# Patient Record
Sex: FEMALE | Race: WHITE | Employment: OTHER | URBAN - METROPOLITAN AREA
[De-identification: names, ages, dates, MRNs, and addresses within clinical notes are randomized per-mention and may not be internally consistent; named-entity substitution may affect disease eponyms.]

---

## 2017-02-08 PROBLEM — R73.02 GLUCOSE INTOLERANCE (IMPAIRED GLUCOSE TOLERANCE): Status: ACTIVE | Noted: 2017-02-08

## 2017-06-09 PROBLEM — F33.42 RECURRENT MAJOR DEPRESSIVE DISORDER, IN FULL REMISSION (HCC): Status: ACTIVE | Noted: 2017-06-09

## 2018-05-16 PROBLEM — E78.00 HYPERCHOLESTEREMIA: Status: ACTIVE | Noted: 2018-05-16

## 2019-06-04 ENCOUNTER — HOSPITAL ENCOUNTER (OUTPATIENT)
Dept: GENERAL RADIOLOGY | Age: 64
Discharge: HOME | End: 2019-06-04
Attending: INTERNAL MEDICINE
Payer: MEDICARE

## 2019-06-04 DIAGNOSIS — M54.50 CHRONIC RIGHT-SIDED LOW BACK PAIN WITHOUT SCIATICA: ICD-10-CM

## 2019-06-04 DIAGNOSIS — G89.29 CHRONIC RIGHT-SIDED LOW BACK PAIN WITHOUT SCIATICA: ICD-10-CM

## 2019-06-04 PROCEDURE — 72100 X-RAY EXAM L-S SPINE 2/3 VWS: CPT

## 2019-06-12 ENCOUNTER — APPOINTMENT (OUTPATIENT)
Dept: MAMMOGRAPHY | Age: 64
End: 2019-06-12
Attending: INTERNAL MEDICINE
Payer: MEDICARE

## 2019-06-12 ENCOUNTER — HOSPITAL ENCOUNTER (OUTPATIENT)
Dept: VASCULAR SURGERY | Age: 64
Discharge: HOME | End: 2019-06-12
Attending: INTERNAL MEDICINE
Payer: MEDICARE

## 2019-06-12 DIAGNOSIS — R09.89 BRUIT: ICD-10-CM

## 2019-06-12 PROCEDURE — 93880 EXTRACRANIAL BILAT STUDY: CPT

## 2019-06-13 LAB
LEFT CCA DIST DIAS: 25 CM/S
LEFT CCA DIST SYS: 97.4 CM/S
LEFT CCA PROX DIAS: 14.6 CM/S
LEFT CCA PROX SYS: 92.2 CM/S
LEFT ECA DIAS: 22.07 CM/S
LEFT ECA SYS: 105.4 CM/S
LEFT ICA DIST DIAS: 32.8 CM/S
LEFT ICA DIST SYS: 92.5 CM/S
LEFT ICA MID DIAS: 28.5 CM/S
LEFT ICA MID SYS: 75.4 CM/S
LEFT ICA PROX DIAS: 19.8 CM/S
LEFT ICA PROX SYS: 69 CM/S
LEFT ICA/CCA SYS: 0.95
LEFT VERTEBRAL DIAS: 16.81 CM/S
LEFT VERTEBRAL SYS: 52 CM/S
RIGHT CCA DIST DIAS: 23.7 CM/S
RIGHT CCA DIST SYS: 74.2 CM/S
RIGHT CCA PROX DIAS: 15.9 CM/S
RIGHT CCA PROX SYS: 101.3 CM/S
RIGHT ECA DIAS: 15.89 CM/S
RIGHT ECA SYS: 96.1 CM/S
RIGHT ICA DIST DIAS: 33.3 CM/S
RIGHT ICA DIST SYS: 109.3 CM/S
RIGHT ICA MID DIAS: 35.5 CM/S
RIGHT ICA MID SYS: 101.6 CM/S
RIGHT ICA PROX DIAS: 25.6 CM/S
RIGHT ICA PROX SYS: 71.8 CM/S
RIGHT ICA/CCA SYS: 1.5
RIGHT VERTEBRAL DIAS: 17.71 CM/S
RIGHT VERTEBRAL SYS: 49.1 CM/S

## 2021-02-19 ENCOUNTER — HOSPITAL ENCOUNTER (OUTPATIENT)
Dept: ULTRASOUND IMAGING | Age: 66
Discharge: HOME | End: 2021-02-19
Attending: INTERNAL MEDICINE
Payer: MEDICARE

## 2021-02-19 DIAGNOSIS — M79.661 RIGHT CALF PAIN: ICD-10-CM

## 2021-02-19 PROCEDURE — 93971 EXTREMITY STUDY: CPT

## 2021-10-03 ENCOUNTER — HOSPITAL ENCOUNTER (EMERGENCY)
Age: 66
Discharge: HOME | End: 2021-10-03
Attending: EMERGENCY MEDICINE | Admitting: EMERGENCY MEDICINE
Payer: MEDICARE

## 2021-10-03 VITALS
TEMPERATURE: 97.3 F | DIASTOLIC BLOOD PRESSURE: 84 MMHG | SYSTOLIC BLOOD PRESSURE: 156 MMHG | OXYGEN SATURATION: 99 % | RESPIRATION RATE: 18 BRPM | HEART RATE: 76 BPM

## 2021-10-03 DIAGNOSIS — L03.90 CELLULITIS, UNSPECIFIED CELLULITIS SITE: ICD-10-CM

## 2021-10-03 DIAGNOSIS — B37.2 CANDIDAL INTERTRIGO: Primary | ICD-10-CM

## 2021-10-03 PROCEDURE — 74011250637 HC RX REV CODE- 250/637: Performed by: PHYSICIAN ASSISTANT

## 2021-10-03 PROCEDURE — 99283 EMERGENCY DEPT VISIT LOW MDM: CPT

## 2021-10-03 RX ORDER — DOXYCYCLINE HYCLATE 100 MG
100 TABLET ORAL 2 TIMES DAILY
Qty: 14 TABLET | Refills: 0 | Status: SHIPPED | OUTPATIENT
Start: 2021-10-03 | End: 2021-10-10

## 2021-10-03 RX ORDER — DOXYCYCLINE HYCLATE 100 MG
100 TABLET ORAL
Status: COMPLETED | OUTPATIENT
Start: 2021-10-03 | End: 2021-10-03

## 2021-10-03 RX ORDER — DOXYLAMINE SUCCINATE 25 MG
TABLET ORAL 2 TIMES DAILY
Qty: 15 G | Refills: 0 | Status: SHIPPED | OUTPATIENT
Start: 2021-10-03

## 2021-10-03 RX ADMIN — DOXYCYCLINE HYCLATE 100 MG: 100 TABLET, COATED ORAL at 17:16

## 2021-10-03 NOTE — ED TRIAGE NOTES
TRIAGE NOTE:  Patient arrives ambulatory with c/o pustules and to left groin in the crease. Patient reports started a couple weeks ago. Patient reports was placed on Bactrim and that helped a little.

## 2021-10-03 NOTE — ED PROVIDER NOTES
79 y/o female presenting with complaint of skin problem. The patient states that 2 and a half weeks ago she noticed a red itchy rash in her left groin with associated pustules. She started taking Bactrim 9 days ago and states that the pustules have now resolved, however over the past few days the rash has been to worsen somewhat again. She denies fevers, nausea, vomiting, body aches or dysuria. The history is provided by the patient. Past Medical History:   Diagnosis Date    Glucose intolerance (impaired glucose tolerance) 2017    Hypertension     Hypothyroid     Psychiatric disorder     depression    Unspecified hypothyroidism 1/3/2014       Past Surgical History:   Procedure Laterality Date    BREAST SURGERY PROCEDURE UNLISTED      breast implants and removal    HX GYN      hysterectomy         Family History:   Problem Relation Age of Onset    Heart Disease Father     Cancer Father          76 colon    Hypertension Father        Social History     Socioeconomic History    Marital status:      Spouse name: Not on file    Number of children: Not on file    Years of education: Not on file    Highest education level: Not on file   Occupational History    Not on file   Tobacco Use    Smoking status: Never Smoker    Smokeless tobacco: Never Used   Substance and Sexual Activity    Alcohol use: No    Drug use: Not on file    Sexual activity: Not on file   Other Topics Concern    Not on file   Social History Narrative    Not on file     Social Determinants of Health     Financial Resource Strain:     Difficulty of Paying Living Expenses:    Food Insecurity:     Worried About Running Out of Food in the Last Year:     Ran Out of Food in the Last Year:    Transportation Needs:     Lack of Transportation (Medical):      Lack of Transportation (Non-Medical):    Physical Activity:     Days of Exercise per Week:     Minutes of Exercise per Session:    Stress:     Feeling of Stress :    Social Connections:     Frequency of Communication with Friends and Family:     Frequency of Social Gatherings with Friends and Family:     Attends [employer] Services:     Active Member of Clubs or Organizations:     Attends Club or Organization Meetings:     Marital Status:    Intimate Partner Violence:     Fear of Current or Ex-Partner:     Emotionally Abused:     Physically Abused:     Sexually Abused: ALLERGIES: Ciprofloxacin, Flagyl [metronidazole], and Keflex [cephalexin]    Review of Systems   Constitutional: Negative for fever. HENT: Negative for congestion. Respiratory: Negative for cough. Gastrointestinal: Negative for diarrhea and vomiting. Genitourinary: Negative for dysuria, frequency and urgency. Musculoskeletal: Negative for arthralgias and myalgias. Skin: Positive for rash. Neurological: Negative for syncope and light-headedness. Vitals:    10/03/21 1617   BP: (!) 156/84   Pulse: 76   Resp: 18   Temp: 97.3 °F (36.3 °C)   SpO2: 99%            Physical Exam  Vitals and nursing note reviewed. Constitutional:       General: She is not in acute distress. Appearance: She is well-developed. She is not diaphoretic. HENT:      Head: Normocephalic and atraumatic. Eyes:      Conjunctiva/sclera: Conjunctivae normal.   Cardiovascular:      Rate and Rhythm: Normal rate. Pulmonary:      Effort: Pulmonary effort is normal. No respiratory distress. Musculoskeletal:      Cervical back: Normal range of motion and neck supple. Skin:     General: Skin is warm and dry. Comments: Left groin with large area of erythema, raised borders and satellite lesions noted. Neurological:      Mental Status: She is alert and oriented to person, place, and time. MDM       Procedures        77 y/o female presenting with complaint of skin problem. History and exam consistent with candidal intertrigo with likely secondary bacterial skin infection. Will discontinue Bactrim, prescribe doxycycline and topical miconazole. PCP follow up if rash continues. Strict ED return precautions discussed and provided in writing at time of discharge. The patient verbalized understanding and agreement with this plan.

## 2022-02-22 PROBLEM — E74.9 CARBOHYDRATE METABOLISM DISORDER (HCC): Status: ACTIVE | Noted: 2022-02-22

## 2022-03-19 PROBLEM — E74.9 CARBOHYDRATE METABOLISM DISORDER (HCC): Status: ACTIVE | Noted: 2022-02-22

## 2022-03-19 PROBLEM — R73.02 GLUCOSE INTOLERANCE (IMPAIRED GLUCOSE TOLERANCE): Status: ACTIVE | Noted: 2017-02-08

## 2022-03-19 PROBLEM — E78.00 HYPERCHOLESTEREMIA: Status: ACTIVE | Noted: 2018-05-16

## 2022-03-19 PROBLEM — F33.42 RECURRENT MAJOR DEPRESSIVE DISORDER, IN FULL REMISSION (HCC): Status: ACTIVE | Noted: 2017-06-09

## 2024-03-14 ENCOUNTER — OFFICE VISIT (OUTPATIENT)
Age: 69
End: 2024-03-14

## 2024-03-14 VITALS — HEIGHT: 66 IN | WEIGHT: 185 LBS | BODY MASS INDEX: 29.73 KG/M2

## 2024-03-14 DIAGNOSIS — Z12.39 SCREENING BREAST EXAMINATION: Primary | ICD-10-CM

## 2024-03-14 NOTE — PROGRESS NOTES
HISTORY OF PRESENT ILLNESS  Kolby Wahl is a 68 y.o. female     HPI NEW patient consult referred by Dr. Harley George for screening breast exam.  Her last mammogram was about 5 years ago.  It was very painful and she is no longer doing mammograms.  Silicone implants [id number].  Capsules remain.   Here with her     No family history of breast or ovarian cancer.     Mammogram about 5 years ago.  painful    Past Medical History:   Diagnosis Date    Glucose intolerance (impaired glucose tolerance) 2017    Hypertension     Hypothyroid     Psychiatric disorder     depression    Unspecified hypothyroidism 1/3/2014     Past Surgical History:   Procedure Laterality Date    BREAST SURGERY      breast implants and removal    GYN      hysterectomy      OB History    No obstetric history on file.      Obstetric Comments   Menarche 11, LMP , # of children 3, age of 1st delivery 28, Hysterectomy/oophorectomy yes-/no, Breast bx no, history of breast feeding no, BCP no, Hormone therapy no             Family History   Problem Relation Age of Onset    Heart Disease Father     Cancer Father          75 colon    Hypertension Father      Social History     Tobacco Use    Smoking status: Never    Smokeless tobacco: Never   Substance Use Topics    Alcohol use: No      Prior to Admission medications    Medication Sig Start Date End Date Taking? Authorizing Provider   levothyroxine (SYNTHROID) 125 MCG tablet Take 1 tablet by mouth daily 24  Yes Harley George MD   sertraline (ZOLOFT) 25 MG tablet Take 2 tablets by mouth daily 20  Yes Automatic Reconciliation, Ar   permethrin (ELIMITE) 5 % cream Apply topically daily as directed 3/3/24   Harley George MD   sertraline (ZOLOFT) 50 MG tablet TAKE ONE TABLET BY MOUTH EVERY DAY (CIPLA BRAND) 23   Harley George MD      Allergies   Allergen Reactions    Cephalexin Other (See Comments)     insomnia    Ciprofloxacin Hives    Metronidazole Hives

## 2024-07-09 ENCOUNTER — APPOINTMENT (OUTPATIENT)
Facility: HOSPITAL | Age: 69
End: 2024-07-09
Payer: MEDICARE

## 2024-07-09 ENCOUNTER — HOSPITAL ENCOUNTER (EMERGENCY)
Facility: HOSPITAL | Age: 69
Discharge: HOME | End: 2024-07-09
Attending: EMERGENCY MEDICINE
Payer: MEDICARE

## 2024-07-09 VITALS
HEART RATE: 48 BPM | DIASTOLIC BLOOD PRESSURE: 43 MMHG | HEIGHT: 66 IN | TEMPERATURE: 98.1 F | BODY MASS INDEX: 29.09 KG/M2 | RESPIRATION RATE: 16 BRPM | SYSTOLIC BLOOD PRESSURE: 107 MMHG | WEIGHT: 181 LBS | OXYGEN SATURATION: 95 %

## 2024-07-09 DIAGNOSIS — K65.4 MESENTERIC PANNICULITIS (HCC): Primary | ICD-10-CM

## 2024-07-09 LAB
ALBUMIN SERPL-MCNC: 3.6 G/DL (ref 3.5–5)
ALBUMIN/GLOB SERPL: 1.1 (ref 1.1–2.2)
ALP SERPL-CCNC: 78 U/L (ref 45–117)
ALT SERPL-CCNC: 23 U/L (ref 12–78)
ANION GAP SERPL CALC-SCNC: 4 MMOL/L (ref 5–15)
APPEARANCE UR: CLEAR
AST SERPL-CCNC: 13 U/L (ref 15–37)
BACTERIA URNS QL MICRO: NEGATIVE /HPF
BASOPHILS # BLD: 0.1 K/UL (ref 0–0.1)
BASOPHILS NFR BLD: 1 % (ref 0–1)
BILIRUB SERPL-MCNC: 0.4 MG/DL (ref 0.2–1)
BILIRUB UR QL: NEGATIVE
BUN SERPL-MCNC: 13 MG/DL (ref 6–20)
BUN/CREAT SERPL: 15 (ref 12–20)
CALCIUM SERPL-MCNC: 8.9 MG/DL (ref 8.5–10.1)
CHLORIDE SERPL-SCNC: 106 MMOL/L (ref 97–108)
CO2 SERPL-SCNC: 25 MMOL/L (ref 21–32)
COLOR UR: NORMAL
CREAT SERPL-MCNC: 0.84 MG/DL (ref 0.55–1.02)
DIFFERENTIAL METHOD BLD: ABNORMAL
EKG ATRIAL RATE: 64 BPM
EKG DIAGNOSIS: NORMAL
EKG P AXIS: 11 DEGREES
EKG P-R INTERVAL: 166 MS
EKG Q-T INTERVAL: 436 MS
EKG QRS DURATION: 88 MS
EKG QTC CALCULATION (BAZETT): 449 MS
EKG R AXIS: 94 DEGREES
EKG T AXIS: 48 DEGREES
EKG VENTRICULAR RATE: 64 BPM
EOSINOPHIL # BLD: 0.1 K/UL (ref 0–0.4)
EOSINOPHIL NFR BLD: 1 % (ref 0–7)
EPITH CASTS URNS QL MICRO: NORMAL /LPF
ERYTHROCYTE [DISTWIDTH] IN BLOOD BY AUTOMATED COUNT: 11.9 % (ref 11.5–14.5)
GLOBULIN SER CALC-MCNC: 3.2 G/DL (ref 2–4)
GLUCOSE SERPL-MCNC: 98 MG/DL (ref 65–100)
GLUCOSE UR STRIP.AUTO-MCNC: NEGATIVE MG/DL
HCT VFR BLD AUTO: 39.4 % (ref 35–47)
HGB BLD-MCNC: 13.7 G/DL (ref 11.5–16)
HGB UR QL STRIP: NEGATIVE
HYALINE CASTS URNS QL MICRO: NORMAL /LPF (ref 0–2)
IMM GRANULOCYTES # BLD AUTO: 0.1 K/UL (ref 0–0.04)
IMM GRANULOCYTES NFR BLD AUTO: 1 % (ref 0–0.5)
KETONES UR QL STRIP.AUTO: NEGATIVE MG/DL
LEUKOCYTE ESTERASE UR QL STRIP.AUTO: NEGATIVE
LIPASE SERPL-CCNC: 109 U/L (ref 13–75)
LYMPHOCYTES # BLD: 2 K/UL (ref 0.8–3.5)
LYMPHOCYTES NFR BLD: 23 % (ref 12–49)
MCH RBC QN AUTO: 28.7 PG (ref 26–34)
MCHC RBC AUTO-ENTMCNC: 34.8 G/DL (ref 30–36.5)
MCV RBC AUTO: 82.6 FL (ref 80–99)
MONOCYTES # BLD: 0.4 K/UL (ref 0–1)
MONOCYTES NFR BLD: 5 % (ref 5–13)
NEUTS SEG # BLD: 6.1 K/UL (ref 1.8–8)
NEUTS SEG NFR BLD: 69 % (ref 32–75)
NITRITE UR QL STRIP.AUTO: NEGATIVE
NRBC # BLD: 0 K/UL (ref 0–0.01)
NRBC BLD-RTO: 0 PER 100 WBC
PH UR STRIP: 5.5 (ref 5–8)
PLATELET # BLD AUTO: 240 K/UL (ref 150–400)
PMV BLD AUTO: 9.9 FL (ref 8.9–12.9)
POTASSIUM SERPL-SCNC: 4.1 MMOL/L (ref 3.5–5.1)
PROT SERPL-MCNC: 6.8 G/DL (ref 6.4–8.2)
PROT UR STRIP-MCNC: NEGATIVE MG/DL
RBC # BLD AUTO: 4.77 M/UL (ref 3.8–5.2)
RBC #/AREA URNS HPF: NORMAL /HPF (ref 0–5)
SODIUM SERPL-SCNC: 135 MMOL/L (ref 136–145)
SP GR UR REFRACTOMETRY: 1.02 (ref 1–1.03)
SPECIMEN HOLD: NORMAL
TROPONIN I SERPL HS-MCNC: <4 NG/L (ref 0–51)
UROBILINOGEN UR QL STRIP.AUTO: 1 EU/DL (ref 0.2–1)
WBC # BLD AUTO: 8.7 K/UL (ref 3.6–11)
WBC URNS QL MICRO: NORMAL /HPF (ref 0–4)

## 2024-07-09 PROCEDURE — 6360000002 HC RX W HCPCS: Performed by: NURSE PRACTITIONER

## 2024-07-09 PROCEDURE — 80053 COMPREHEN METABOLIC PANEL: CPT

## 2024-07-09 PROCEDURE — 36415 COLL VENOUS BLD VENIPUNCTURE: CPT

## 2024-07-09 PROCEDURE — 93005 ELECTROCARDIOGRAM TRACING: CPT | Performed by: NURSE PRACTITIONER

## 2024-07-09 PROCEDURE — 74177 CT ABD & PELVIS W/CONTRAST: CPT

## 2024-07-09 PROCEDURE — 81001 URINALYSIS AUTO W/SCOPE: CPT

## 2024-07-09 PROCEDURE — 85025 COMPLETE CBC W/AUTO DIFF WBC: CPT

## 2024-07-09 PROCEDURE — 83690 ASSAY OF LIPASE: CPT

## 2024-07-09 PROCEDURE — 93010 ELECTROCARDIOGRAM REPORT: CPT | Performed by: INTERNAL MEDICINE

## 2024-07-09 PROCEDURE — 84484 ASSAY OF TROPONIN QUANT: CPT

## 2024-07-09 PROCEDURE — 96374 THER/PROPH/DIAG INJ IV PUSH: CPT

## 2024-07-09 PROCEDURE — 99285 EMERGENCY DEPT VISIT HI MDM: CPT

## 2024-07-09 PROCEDURE — 6360000004 HC RX CONTRAST MEDICATION: Performed by: EMERGENCY MEDICINE

## 2024-07-09 PROCEDURE — 2580000003 HC RX 258: Performed by: NURSE PRACTITIONER

## 2024-07-09 RX ORDER — 0.9 % SODIUM CHLORIDE 0.9 %
1000 INTRAVENOUS SOLUTION INTRAVENOUS ONCE
Status: COMPLETED | OUTPATIENT
Start: 2024-07-09 | End: 2024-07-09

## 2024-07-09 RX ORDER — IBUPROFEN 600 MG/1
600 TABLET ORAL 4 TIMES DAILY PRN
Qty: 20 TABLET | Refills: 0 | Status: SHIPPED | OUTPATIENT
Start: 2024-07-09

## 2024-07-09 RX ORDER — ONDANSETRON 2 MG/ML
4 INJECTION INTRAMUSCULAR; INTRAVENOUS ONCE
Status: COMPLETED | OUTPATIENT
Start: 2024-07-09 | End: 2024-07-09

## 2024-07-09 RX ADMIN — ONDANSETRON 4 MG: 2 INJECTION INTRAMUSCULAR; INTRAVENOUS at 13:54

## 2024-07-09 RX ADMIN — SODIUM CHLORIDE 1000 ML: 9 INJECTION, SOLUTION INTRAVENOUS at 13:53

## 2024-07-09 RX ADMIN — IOPAMIDOL 100 ML: 755 INJECTION, SOLUTION INTRAVENOUS at 14:20

## 2024-07-09 ASSESSMENT — PAIN - FUNCTIONAL ASSESSMENT: PAIN_FUNCTIONAL_ASSESSMENT: 0-10

## 2024-07-09 ASSESSMENT — PAIN SCALES - GENERAL: PAINLEVEL_OUTOF10: 8

## 2024-07-09 ASSESSMENT — PAIN DESCRIPTION - LOCATION: LOCATION: ABDOMEN

## 2024-07-09 NOTE — ED TRIAGE NOTES
Pt reports 2 weeks of N/V, watery yellow diarrhea and generalized abd pain.  Reports bodyaches and chills but has not taken temperature. Afebrile on arrival.     Reports some chest pressure and dizziness intermittently but not today.    Saw PCP yesterday and dropped off stool sample this morning

## 2024-07-09 NOTE — ED PROVIDER NOTES
[facility] EMERGENCY DEPT  EMERGENCY DEPARTMENT ENCOUNTER      Pt Name: Kolby Wahl  MRN: [id number]  Birthdate 1955  Date of evaluation: 2024  Provider: WAYLON Marie NP      HISTORY OF PRESENT ILLNESS      HPI        Nursing Notes were reviewed.    REVIEW OF SYSTEMS         Review of Systems        PAST MEDICAL HISTORY     Past Medical History:   Diagnosis Date    Glucose intolerance (impaired glucose tolerance) 2017    Hypertension     Hypothyroid     Psychiatric disorder     depression    Unspecified hypothyroidism 1/3/2014         SURGICAL HISTORY       Past Surgical History:   Procedure Laterality Date    BREAST SURGERY      breast implants and removal    GYN      hysterectomy         CURRENT MEDICATIONS       Previous Medications    PERMETHRIN (ELIMITE) 5 % CREAM    Apply topically daily as directed    SERTRALINE (ZOLOFT) 25 MG TABLET    Take 2 tablets by mouth daily    SERTRALINE (ZOLOFT) 50 MG TABLET    TAKE ONE TABLET BY MOUTH EVERY DAY (CIPLA BRAND)    SYNTHROID 100 MCG TABLET    Take 1 tablet by mouth daily       ALLERGIES     Cephalexin, Ciprofloxacin, Metronidazole, and Sulfa antibiotics    FAMILY HISTORY       Family History   Problem Relation Age of Onset    Heart Disease Father     Cancer Father          75 colon    Hypertension Father           SOCIAL HISTORY       Social History     Socioeconomic History    Marital status:    Tobacco Use    Smoking status: Never    Smokeless tobacco: Never   Substance and Sexual Activity    Alcohol use: No     Social Determinants of Health     Physical Activity: Insufficiently Active (3/20/2024)    Exercise Vital Sign     Days of Exercise per Week: 3 days     Minutes of Exercise per Session: 30 min         PHYSICAL EXAM       ED Triage Vitals [24 1224]   BP Temp Temp Source Pulse Respirations SpO2 Height Weight - Scale   113/70 97.3 °F (36.3 °C) Oral 60 16 96 % 1.676 m (5' 6\") 82.1 kg (181 lb)       Body mass index is

## 2024-07-19 ASSESSMENT — ENCOUNTER SYMPTOMS
VOMITING: 1
TROUBLE SWALLOWING: 0
SINUS PRESSURE: 0
ABDOMINAL DISTENTION: 0
SHORTNESS OF BREATH: 0
EYE PAIN: 0
COLOR CHANGE: 0
EYE REDNESS: 0
COUGH: 0
NAUSEA: 1
ABDOMINAL PAIN: 1
SINUS PAIN: 0